# Patient Record
Sex: FEMALE | Race: WHITE
[De-identification: names, ages, dates, MRNs, and addresses within clinical notes are randomized per-mention and may not be internally consistent; named-entity substitution may affect disease eponyms.]

---

## 2018-12-31 ENCOUNTER — HOSPITAL ENCOUNTER (EMERGENCY)
Dept: HOSPITAL 10 - FTE | Age: 53
Discharge: HOME | End: 2018-12-31
Payer: MEDICAID

## 2018-12-31 ENCOUNTER — HOSPITAL ENCOUNTER (EMERGENCY)
Dept: HOSPITAL 91 - FTE | Age: 53
Discharge: HOME | End: 2018-12-31
Payer: MEDICAID

## 2018-12-31 VITALS
HEIGHT: 64 IN | BODY MASS INDEX: 34.74 KG/M2 | BODY MASS INDEX: 34.74 KG/M2 | WEIGHT: 203.49 LBS | WEIGHT: 203.49 LBS | HEIGHT: 64 IN

## 2018-12-31 VITALS — DIASTOLIC BLOOD PRESSURE: 79 MMHG | RESPIRATION RATE: 19 BRPM | SYSTOLIC BLOOD PRESSURE: 129 MMHG | HEART RATE: 65 BPM

## 2018-12-31 DIAGNOSIS — J02.9: Primary | ICD-10-CM

## 2018-12-31 DIAGNOSIS — R06.02: ICD-10-CM

## 2018-12-31 PROCEDURE — 96372 THER/PROPH/DIAG INJ SC/IM: CPT

## 2018-12-31 PROCEDURE — 99284 EMERGENCY DEPT VISIT MOD MDM: CPT

## 2018-12-31 PROCEDURE — 70360 X-RAY EXAM OF NECK: CPT

## 2018-12-31 PROCEDURE — 94664 DEMO&/EVAL PT USE INHALER: CPT

## 2018-12-31 RX ADMIN — DEXAMETHASONE SODIUM PHOSPHATE 1 MG: 10 INJECTION, SOLUTION INTRAMUSCULAR; INTRAVENOUS at 05:02

## 2018-12-31 RX ADMIN — RACEPINEPHRINE HYDROCHLORIDE 1 ML: 11.25 SOLUTION RESPIRATORY (INHALATION) at 05:21

## 2018-12-31 RX ADMIN — IBUPROFEN 1 MG: 600 TABLET ORAL at 05:22

## 2018-12-31 RX ADMIN — LORAZEPAM 1 MG: 0.5 TABLET ORAL at 05:22

## 2023-02-13 NOTE — ERD
ER Documentation


Chief Complaint


Chief Complaint





woke up about 20 minutes ago c/o sore throat/sob/anxious. lungs clear.





HPI


53-year-old female presenting with neck pain and trouble swallowing.  She feels 


like something is swollen and stuck in her neck.  She denies any foreign bodies.


 She has anxiety associated with the fact that she feels there is something 


swelling in her neck.  She denies any shortness of breath or trouble swallowing.


 She has never had this before.  Has not taken medications for her symptoms.  


Denies medical problems.  Allergic to pollen however no allergies to 


medications.  NKDA.  Surgical history denies.





ROS


All systems reviewed and are negative except as per history of present illness.





Medications


Home Meds


Active Scripts


Prednisone* (Prednisone*) 20 Mg Tab, 40 MG PO DAILY for 4 Days, TAB


   Prov:RAN PHILLIPS PA-C         18





Allergies


Allergies:  


Coded Allergies:  


     No Known Allergy (Unverified , 18)





PMhx/Soc


Medical and Surgical Hx:  pt denies Medical Hx, pt denies Surgical Hx


Hx Alcohol Use:  No


Hx Substance Use:  No


Hx Tobacco Use:  No





FmHx


Family History:  No diabetes, No coronary disease, No other





Physical Exam


Vitals





Vital Signs


  Date      Temp  Pulse  Resp  B/P (MAP)   Pulse Ox  O2          O2 Flow    FiO2


Time                                                 Delivery    Rate


  18           75    18                    97                           21


     05:21


  18  98.0     91    18      144/78       100


     04:25                          (100)





Physical Exam


GENERAL: The patient is well-appearing, well-nourished, in no acute distress


HEENT: Atraumatic.  Conjunctivae are pink.  Pupils equal, round, and reactive to


light.  There is no scleral icterus.  Tympanic membranes clear bilaterally.  


Oropharynx clear.  


NECK: C-spine is soft and supple.  There is no meningismus.  There is no 


cervical lymphadenopathy. 


CHEST: Clear to auscultation bilaterally.  There are no rales, wheezes or 


rhonchi.


HEART: Regular rate and rhythm.  No murmurs, clicks, rubs or gallops.  No S3 or 


S4.


Results 24 hrs





Current Medications


 Medications
   Dose
          Sig/Jasmina
       Start Time
   Status  Last


 (Trade)       Ordered        Route
 PRN     Stop Time              Admin
Dose


                              Reason                                Admin


                10 mg          ONCE  ONCE
    18      DC          18


Dexamethasone                 IM
            05:00
                       05:02




  (Decadron)                                18


                                             05:01


 Epinephrine
   0.5 ml         ONCE  ONCE
    18      DC          18


                              HHN
           05:30
                       05:21



(Racepinephri                                18


ne
 2.25%                                    05:31


(Neb))


 Ibuprofen
     600 mg         ONCE  ONCE
    18      DC          18


(Motrin)                      PO
            05:30
                       05:22



                                             18


                                             05:31


 Lorazepam
     0.5 mg         ONCE  ONCE
    18      DC          18


(Ativan)                      PO
            05:30
                       05:22



                                             18


                                             05:31








Procedures/MDM


ER course: Decadron given in ED.  Racemic epi inhaled in ED.  Symptoms improved 


with medications.  Ativan also given ED.





                            DIAGNOSTIC IMAGING REPORT





Patient: TERRI OSWALD   : 1965   Age: 53  Sex: F                        


       MR #:    [id number]   Acct #:   [id number]    DOS: 18 0443


Ordering MD: JAYME PHILLIPS PA-C   Location:  [facility]   Room/Bed:            


                               


                                        


PROCEDURE:   Cervical spine series


 


CLINICAL INDICATION:   Something stuck in throat


 


TECHNIQUE:   AP and lateral views


 


COMPARISON:   None available


 


FINDINGS:


 


The soft tissues of the neck and epiglottis are normal. No radiodense foreign 


bodies are present. The spine alignment imaged from the skull base to the C6-7 


level is normal. No definite evidence for fractures or traumatic subluxations 


are present. Reservation of vertebral body heights disc spaces are noted. The 


bilateral lung apices and imaged ribs and clavicles are clear.


 


IMPRESSION:


 


1.  No radiodense foreign bodies and normal soft tissue neck.


 


MDM: 53-year-old female presenting with swelling sensation in her throat.  I 


have considered epiglottitis however of low suspicion as patient is afebrile and


does not have a hot potato voice.  Patient is swallowing saliva without 


difficulty.  I have low suspicion for retained foreign body.  Patient found 


relief with medications given in the ED.  Patient is discharged with supportive 


medications and told to follow-up with primary care.  Patient is told symptoms 


change or worsen to return to the ER.  All questions answered at discharge





Departure


Diagnosis:  


   Primary Impression:  


   Sore throat


Condition:  Stable


Patient Instructions:  When You Have a Sore Throat


Referrals:  


COMM[facility]ITY CLINICS


YOU HAVE RECEIVED A MEDICAL SCREENING EXAM AND THE RESULTS INDICATE THAT YOU DO 


NOT HAVE A CONDITION THAT REQUIRES URGENT TREATMENT IN THE EMERGENCY DEPARTMENT.





FURTHER EVALUATION AND TREATMENT OF YOUR CONDITION CAN WAIT UNTIL YOU ARE SEEN 


IN YOUR DOCTORS OFFICE WITHIN THE NEXT 1-2 DAYS. IT IS YOUR RESPONSIBILITY TO 


MAKE AN APPOINTMENT FOR FOLOW-UP CARE.





IF YOU HAVE A PRIMARY DOCTOR


--you should call your primary doctor and schedule an appointment





IF YOU DO NOT HAVE A PRIMARY DOCTOR YOU CAN CALL OUR PHYSICIAN REFERRAL HOTLINE 


AT


 [phone] 





IF YOU CAN NOT AFFORD TO SEE A PHYSICIAN YOU CAN CHOSE FROM THE FOLLOWING 


[facility] CLINICS





[facility] [phone] [address] [facility] [phone] [address] [facility]. [facility] [phone] [address] [facility] [phone] [address] [facility] [phone] [address] [facility]. [facility] [phone]


[address]





Additional Instructions:  


FOLLOW UP WITH YOUR PRIMARY CARE PHYSICIAN TOMORROW.Return to this facility if 


you are not improving as expected.











RAN PHILLIPS PA-C       Dec 31, 2018 05:51 [facility] PROGRESS NOTE      Irma Mendoza  : 1938  PCP: Lindy Staley MD    Subjective  No CP or SOB. Feeling well    Medications  Prior to Admission medications    Medication Sig Start Date End Date Taking? Authorizing Provider   ipratropium-albuterol (DUONEB) 0.5-2.5 (3) MG/3ML nebulizer solution Take 3 mLs by nebulization every 6 hours as needed.   Yes Outside Provider   fluticasone-vilanterol (BREO ELLIPTA) 100-25 MCG/ACT inhaler Inhale 1 puff into the lungs daily.   Yes Outside Provider   carvedilol (COREG) 3.125 MG tablet Take 1 tablet by mouth every 12 hours. 12/19/22 2/10/23 Yes Ana Schneider DO   levETIRAcetam (KepPRA) 500 MG tablet Take 1 tablet by mouth in the morning and 1 tablet in the evening. 22  Yes Ana Schneider DO   furosemide (LASIX) 20 MG tablet Take 1 tablet by mouth daily. 22 Yes Izabela Enrique MD   ticagrelor (BRILINTA) 90 MG Tab Take 1 tablet by mouth in the morning and 1 tablet in the evening. 11/18/22 2/10/23 Yes Nato Mari DO   montelukast (SINGULAIR) 10 MG tablet Take 1 tablet by mouth every evening. 11/18/22 2/10/23 Yes Nato Mari DO   lisinopril (ZESTRIL) 5 MG tablet Take 1 tablet by mouth daily. 22  Yes Elizabeth Mcguire CNP   acetaminophen (TYLENOL) 325 MG tablet Take 650 mg by mouth every 6 hours as needed.   Yes Outside Provider   cholecalciferol (VITAMIN D) 25 mcg (1,000 units) tablet Take 1,000 Units by mouth daily.   Yes Outside Provider   Ascorbic Acid (vitamin C) 500 MG tablet Take 500 mg by mouth daily.   Yes Outside Provider   simvastatin (ZOCOR) 20 MG tablet Take 1 tablet by mouth daily. 22  Yes Outside Provider   pantoprazole (PROTONIX) 40 MG tablet Take 40 mg by mouth every morning.   Yes Outside Provider   allopurinol (ZYLOPRIM) 100 MG tablet Take 100 mg by mouth daily.   Yes Outside Provider   ferrous sulfate 325 (65 FE) MG tablet Take 325 mg by mouth daily.   Yes Outside Provider   metoPROLOL tartrate  (LOPRESSOR) 25 MG tablet Take 0.5 tablets by mouth every 12 hours.  Patient taking differently: Take 12.5 mg by mouth in the morning and 12.5 mg in the evening. Hold if SBP < 110 or HR < 60 9/30/22 10/13/22  Violette Simons MD   Eliquis 5 MG Tab Take 5 mg by mouth 2 times daily. 7/22/22 9/30/22  Outside Provider   aspirin 81 MG EC tablet Chew 81 mg by mouth daily.  9/30/22  Outside Provider   lisinopril-hydroCHLOROthiazide (ZESTORETIC) 20-25 MG per tablet Take 1 tablet by mouth daily.  9/30/22  Outside Provider     Current Facility-Administered Medications   Medication Dose Route Frequency Provider Last Rate Last Admin   • carvedilol (COREG) tablet 6.25 mg  6.25 mg Oral 2 times per day Shivam Paige MD       • [START ON 2/14/2023] lisinopril (ZESTRIL) tablet 10 mg  10 mg Oral Daily Shivam Paige MD       • furosemide (LASIX) tablet 40 mg  40 mg Oral Once per day on Mon Wed Fri Shivam Paige MD   40 mg at 02/13/23 0929   • furosemide (LASIX) tablet 20 mg  20 mg Oral Once per day on Sun Tue Thu Sat Shivam Paige MD       • potassium CHLORIDE (KLOR-CON M) capri ER tablet 20 mEq  20 mEq Oral Daily with dinner Shivam Paige MD   20 mEq at 02/12/23 1856   • allopurinol (ZYLOPRIM) tablet 100 mg  100 mg Oral Daily Claudia Glynn MD   100 mg at 02/13/23 0929   • cholecalciferol (VITAMIN D) tablet 1,000 Units  1,000 Units Oral Daily Claudia Glynn MD   1,000 Units at 02/13/23 0929   • ferrous sulfate (65 mg Fe per 325 mg) tablet 325 mg  325 mg Oral Daily Claudia Glynn MD   325 mg at 02/13/23 0929   • fluticasone-vilanterol (BREO ELLIPTA) 100-25 MCG/ACT inhaler 1 puff  1 puff Inhalation Daily Claudia Glynn MD   1 puff at 02/13/23 0931   • levETIRAcetam (KepPRA) tablet 500 mg  500 mg Oral BID Claudia Glynn MD   500 mg at 02/13/23 0929   • montelukast (SINGULAIR) tablet 10 mg  10 mg Oral Q Evening Claudia Glynn MD   10 mg at 02/12/23 1856   • atorvastatin (LIPITOR) tablet 10 mg  10 mg Oral Nightly Claudia Glynn,  MD   10 mg at 02/12/23 2143   • pantoprazole (PROTONIX) EC tablet 40 mg  40 mg Oral QAM Claudia Glynn MD   40 mg at 02/13/23 0501   • ticagrelor (BRILINTA) tablet 90 mg  90 mg Oral BID Claudia Glynn MD   90 mg at 02/13/23 0929   • sodium chloride 0.9 % flush bag 25 mL  25 mL Intravenous PRN Claudia Glynn MD       • sodium chloride (PF) 0.9 % injection 2 mL  2 mL Intracatheter 2 times per day Claudia Glynn MD   2 mL at 02/13/23 0930   • sodium chloride 0.9 % flush bag 25 mL  25 mL Intravenous PRN Claudia Glynn MD       • enoxaparin (LOVENOX) injection 40 mg  40 mg Subcutaneous Daily Claudia Glynn MD   40 mg at 02/13/23 0930   • acetaminophen (TYLENOL) tablet 650 mg  650 mg Oral Q4H PRN Claudia Glynn MD   650 mg at 02/12/23 0936   • polyethylene glycol (MIRALAX) packet 17 g  17 g Oral Daily PRN Claudia Glynn MD   17 g at 02/13/23 0508   • aluminum-magnesium hydroxide-simethicone (MAALOX) 200-200-20 MG/5ML suspension 30 mL  30 mL Oral Q4H PRN Claudia Glynn MD       • ondansetron (ZOFRAN) injection 4 mg  4 mg Intravenous Q6H PRN Claudia Glynn MD           Review of Systems  Review of Systems   All other systems reviewed and are negative.       Physical Exam  Vitals with min/max:      Vital Last Value 24 Hour Range   Temperature 97.9 °F (36.6 °C) (02/13/23 0752) Temp  Min: 97.9 °F (36.6 °C)  Max: 98.7 °F (37.1 °C)   Pulse 70 (02/13/23 0752) Pulse  Min: 55  Max: 72   Respiratory 16 (02/13/23 0752) Resp  Min: 16  Max: 18   Non-Invasive  Blood Pressure (!) 141/82 (02/13/23 0752) BP  Min: 114/83  Max: 164/92   Pulse Oximetry 100 % (02/13/23 0752) SpO2  Min: 97 %  Max: 100 %   Arterial   Blood Pressure   No data recorded              Intake/Output Summary (Last 24 hours) at 2/13/2023 0943  Last data filed at 2/13/2023 0504  Gross per 24 hour   Intake --   Output 1650 ml   Net -1650 ml       Physical Exam  Vitals reviewed.   Cardiovascular:      Rate and Rhythm: Normal rate and regular rhythm.      Pulses: Normal pulses.      Heart  sounds: Normal heart sounds.   Pulmonary:      Effort: Pulmonary effort is normal.      Breath sounds: Normal breath sounds.   Abdominal:      General: Abdomen is flat.      Palpations: Abdomen is soft.   Musculoskeletal:         General: No swelling.   Skin:     General: Skin is warm.   Neurological:      General: No focal deficit present.      Mental Status: She is alert.            Labs  Recent Results (from the past 24 hour(s))   Comprehensive Metabolic Panel    Collection Time: 02/13/23  7:04 AM   Result Value Ref Range    Fasting Status      Sodium 139 135 - 145 mmol/L    Potassium 4.2 3.4 - 5.1 mmol/L    Chloride 106 97 - 110 mmol/L    Carbon Dioxide 26 21 - 32 mmol/L    Anion Gap 11 7 - 19 mmol/L    Glucose 107 (H) 70 - 99 mg/dL    BUN 36 (H) 6 - 20 mg/dL    Creatinine 1.22 (H) 0.51 - 0.95 mg/dL    Glomerular Filtration Rate 44 (L) >=60    BUN/ Creatinine Ratio 30 (H) 7 - 25    Calcium 9.5 8.4 - 10.2 mg/dL    Bilirubin, Total 0.3 0.2 - 1.0 mg/dL    GOT/AST 26 <=37 Units/L    GPT/ALT 17 <64 Units/L    Alkaline Phosphatase 88 45 - 117 Units/L    Albumin 2.6 (L) 3.6 - 5.1 g/dL    Protein, Total 7.3 6.4 - 8.2 g/dL    Globulin 4.7 (H) 2.0 - 4.0 g/dL    A/G Ratio 0.6 (L) 1.0 - 2.4   Magnesium    Collection Time: 02/13/23  7:04 AM   Result Value Ref Range    Magnesium 2.5 (H) 1.7 - 2.4 mg/dL   CBC No Differential    Collection Time: 02/13/23  7:04 AM   Result Value Ref Range    WBC 5.8 4.2 - 11.0 K/mcL    RBC 3.79 (L) 4.00 - 5.20 mil/mcL    HGB 9.2 (L) 12.0 - 15.5 g/dL    HCT 29.9 (L) 36.0 - 46.5 %    MCV 78.9 78.0 - 100.0 fl    MCH 24.3 (L) 26.0 - 34.0 pg    MCHC 30.8 (L) 32.0 - 36.5 g/dL     140 - 450 K/mcL    RDW-CV 17.7 (H) 11.0 - 15.0 %    RDW-SD 50.6 (H) 39.0 - 50.0 fL    NRBC 0 <=0 /100 WBC       Imaging Studies  LAST ECHO/ECHO STRESS:  No valid procedures specified.    LAST MRI:  No results found for this or any previous visit.    LAST CT:  === 02/10/23 ===    CTA CHEST PULMONARY EMBOLISM    -  Narrative -  EXAM: CTA CHEST PULMONARY EMBOLISM    CLINICAL INDICATION: Dyspnea.    COMPARISON:  None.    TECHNIQUE:  CT angiogram of the chest was performed with IV contrast as per pulmonary  embolus protocol.  3-D Maximum Intensity Projections were performed on an  independent workstation with concurrent supervision of a radiologist.  Multiplanar reformats were performed and evaluated.    Contrast: Patient was administered 76 ml of Omnipaque 350without immediate  complications.    FINDINGS:  The thyroid appears heterogeneous.  There is a 2.5 cm hypodense inferior  right thyroid nodule.  No enlarged thoracic nodes.    The heart is enlarged. Dense multivessel calcific atheromatous disease and  native coronary arteries is noted. There is no pericardial effusion. There  is no evidence of pulmonary embolism to the segmental pulmonary arterial  level. The mid ascending aorta measures up to 3.7 cm.  The main pulmonary  artery measures up to 4.6 cm. The esophagus is decompressed. Limited  visualization of the upper abdomen organs is unremarkable.    Evaluation of the lung parenchyma demonstrates changes of centrilobular  emphysema.  Evaluation of the lungs is limited secondary to breathing  motion.  There is bilateral prominence of the interlobar septa.  Subsequently regions of atelectasis noted at lung bases.  There is mild  peribronchial thickening particularly to the lower lobes and lingula.  Central airways are patent.  No consolidation. No pleural effusion.  No  pneumothorax.    Changes of degenerative disc disease is noted.  No aggressive osseous  findings.    - Impression -  No evidence of acute pulmonary embolism.    Findings congestive trace pulmonary edema and trace basilar effusions.    Emphysema.    Cardiomegaly.  Multivessel calcific coronary artery disease.    Enlarged main pulmonary artery, may be seen with pulmonary hypertension.    Heterogeneous multinodular thyroid.    Electronically Signed by: TRACEY  MD UMA  Signed on: 2/10/2023 7:55 PM    LAST EKG:  Encounter Date: 02/10/23   Electrocardiogram 12-Lead   Result Value    Ventricular Rate EKG/Min (BPM) 76    Atrial Rate (BPM) 76    DE-Interval (MSEC) 196    QRS-Interval (MSEC) 96    QT-Interval (MSEC) 420    QTc 472    P Axis (Degrees) 62    R Axis (Degrees) -35    T Axis (Degrees) -29    REPORT TEXT      Normal sinus rhythm  Left axis deviation  Anterolateral infarct  , age undetermined  Abnormal ECG  When compared with ECG of  30-NOV-2022 12:59,  Anterior infarct  is now  present  Anterolateral infarct  is now  present  Inverted T waves have replaced nonspecific T wave abnormality in  Lateral leads  Confirmed by MARYANN BLISS MD ([id number]) on 2/11/2023 7:58:28 AM         LAST X-RAY:  === 02/10/23 ===    XR CHEST AP OR PA    - Narrative -  History: pna    Exam: XR CHEST AP OR PA.    Comparison: Chest radiograph 12/05/2022.    Findings:    Single portable frontal view of the chest.    Stable cardiomegaly.  Normal pulmonary vascularity.  Mild, patchy  bibasilar airspace opacities.  No pleural effusion or pneumothorax.    - Impression -  Impression:    Mild, patchy bibasilar airspace opacities.    Electronically Signed by: ELIZABETH MCDONALD M.D.  Signed on: 2/10/2023 11:28 PM      === 11/30/22 ===    XR CHEST AP OR PA    - Narrative -  Exam: XR CHEST PA OR AP 1 VIEW.    Indication: J90 Pleural effusion, not elsewhere classified  5' 6\" ft 153 lb  sp chest rube removal    Comparison: Earlier today.    Findings: Single view of the chest demonstrates stable  cardiomediastinal  silhouette. There is mild blunting of the left costophrenic margin.  The  left basilar pigtail pleural catheter has been No definite pneumothorax.  The thoracic musculoskeletal structures and the upper abdomen are stable.    - Impression -  Impression:  Stable exam as above.    Electronically Signed by: TRACEY EATON MD  Signed on: 12/5/2022 2:19  PM    ___________________________________________________________________________    XR CHEST AP OR PA    - Narrative -  Portable Chest radiograph    CLINICAL HISTORY: Left effusion.      COMPARISON:  12/03/2022.    TECHNIQUE: Portable chest radiograph is obtained.    FINDINGS:  The heart is borderline enlarged.  Left chest basilar pleural catheter.  Small left pleural effusion.  Left lateral pleural thickening.  Left lung  base opacity.  Mildly hyperexpanded right lung.  Right costophrenic angle  is sharp.  No measurable pneumothorax.  Mild osteopenia.    - Impression -  Left chest basilar pleural catheter.  Small left pleural effusion.  Left  lateral pleural thickening.  Left lung base opacity.    Electronically Signed by: JAZZY FORD MD  Signed on: 12/5/2022 8:07 AM    LAST U/S:  === 09/16/22 ===    US LIVER    - Narrative -  EXAM: US LIVER      INDICATION: Abnormal liver function tests.    COMPARISON: None.    FINDINGS:    1.  There is slight, diffuse increased, echogenicity of a normal size  liver, suggestive of the presence of fatty infiltration.    There is, however, no demonstrable hepatic contour deformity or  evidence of focal intrahepatic defect.    2.  There are multiple foci of shadowing hyperechogenicity within the  gallbladder, consistent with the appearance of cholelithiasis.    There is, however, no evidence of appreciable associated mural  thickening or pericholecystic fluid.    3.  No evidence of intrahepatic or extrahepatic biliary dilatation.    4.  Ultrasonic delineation of the tail of the pancreas is limited by the  presence of persistent superimposed intraluminal bowel gas.    The head, neck, uncinate process and body of the pancreas, as  visualized, appear unremarkable, without contour deformity, echogenic  distortion, focal mass or abnormal peripancreatic fluid collection.    5.  The right kidney, as delineated, exhibits no evidence of intrarenal  calcification, focal mass or  hydronephrosis.    - Impression -  1.  Apparent fatty infiltration of a normal size liver.    2.  Cholelithiasis.    3.  No evidence of acute cholecystitis or biliary dilatation.    Electronically Signed by: NICOLETTE RIOJAS MD  Signed on: 9/27/2022 10:13 AM      Assessment/Plan   84-year-old female with a known history of heart failure and severe coronary artery disease, turned down for CABG, who presents with bilat LE swelling        HFrEF, acute on chronic  - NYHA class 3   CHF symptoms,   -h/o recurrent left pleural effusion s/p chest tube placement in the past  -Change lasix from 20 to 40mg po daily on MWF and 20mg daily on T Th Sat Sun  -31% on echocardiogram performed last admit  -chest CTA neg for PE, LE venous doppler neg for DVT  -continue BB and ACEI     Multivessel coronary artery disease  has refused CABG in the past and her case for CABG was also declined   at [facility] and by Anant Gardner at [facility] in the past. Medical therapy for now only  - continue statin  - continue brilinta single agent therapy  - trop neg this admit     CKD  Monitor creat     Anemia with History of prior GI bleed   EGD revealed erosive gastritis in 2022  Colonoscopy revealed diverticular dz and hemorrhoids in 2022  PPI Rx.  Monitor Hgb     HTN  CPM    HL  On statin     Hypoalbuminemia  - nutrition is a major issue     Creat stable at 1.2. Continue lasix 40mg po daily on MWF and 20mg po daily on T Th Sat Sun  Increase carvedilol 6.25mg po bid, lisinopril 10mg po qd.  OK to DC home from cardiac standpoint. Given her severe CAD and inoperable state, AICD would not be appropriate for her cardiomyopathy.  FU with her cardiologist in 2 weeks. Recommend BMP in 1 week      Jeffery Paige MD, FACC  2/13/2023